# Patient Record
Sex: FEMALE | Race: WHITE | NOT HISPANIC OR LATINO | Employment: PART TIME | URBAN - METROPOLITAN AREA
[De-identification: names, ages, dates, MRNs, and addresses within clinical notes are randomized per-mention and may not be internally consistent; named-entity substitution may affect disease eponyms.]

---

## 2017-01-04 ENCOUNTER — DOCUMENTATION (OUTPATIENT)
Dept: FAMILY MEDICINE CLINIC | Facility: OTHER | Age: 37
End: 2017-01-04

## 2017-01-05 NOTE — PROGRESS NOTES
Operative Note Addendum:    This is in addition to the Op Note from 6/27/16     Her skin tag that was quite large on her vulva measuring approximately 0.5 cm. It was removed and sutured with 3 interrupted sutures of 4-0 Vicryl. The size of the lesion was 0.5 cm.

## 2022-06-29 ENCOUNTER — OFFICE VISIT (OUTPATIENT)
Dept: NEUROLOGY | Facility: CLINIC | Age: 42
End: 2022-06-29

## 2022-06-29 VITALS
SYSTOLIC BLOOD PRESSURE: 128 MMHG | HEART RATE: 64 BPM | DIASTOLIC BLOOD PRESSURE: 72 MMHG | WEIGHT: 174 LBS | RESPIRATION RATE: 16 BRPM | HEIGHT: 62 IN | BODY MASS INDEX: 32.02 KG/M2

## 2022-06-29 DIAGNOSIS — G93.6: Primary | ICD-10-CM

## 2022-06-29 DIAGNOSIS — R51.9 NONINTRACTABLE HEADACHE, UNSPECIFIED CHRONICITY PATTERN, UNSPECIFIED HEADACHE TYPE: ICD-10-CM

## 2022-06-29 DIAGNOSIS — T70.29XS: Primary | ICD-10-CM

## 2022-06-29 PROCEDURE — 99203 OFFICE O/P NEW LOW 30 MIN: CPT | Performed by: STUDENT IN AN ORGANIZED HEALTH CARE EDUCATION/TRAINING PROGRAM

## 2022-06-29 RX ORDER — ONDANSETRON 4 MG/1
4 TABLET, FILM COATED ORAL EVERY 8 HOURS PRN
COMMUNITY
Start: 2022-06-01 | End: 2022-08-14

## 2022-06-29 NOTE — PROGRESS NOTES
Chief Complaint   Patient presents with   • Syncope       Patient ID: Roxanna White is a 42 y.o. female.    HPI:    The following portions of the patient's history were reviewed and updated as appropriate: allergies, current medications, past family history, past medical history, past social history, past surgical history and problem list.    Review of Systems   Allergic/Immunologic: Negative for food allergies.   Neurological: Positive for headaches. Negative for dizziness, tremors, speech difficulty, weakness, light-headedness and numbness.   Psychiatric/Behavioral: Negative for agitation, confusion, decreased concentration and sleep disturbance. The patient is not nervous/anxious.    She was found laying on the floor of her hotel room by her father.  She had headaches at that time.  They gave her oxygen, IV hydration.      Ms. White is a 42-year-old female who presents to neurology clinic for evaluation of symptoms including loss of consciousness in the setting of high-altitude.  She works as a  at [employer].  She was staying at a hotel at [facility]. They were ascending to [facility] from [place] (505 feet above sea level) with breaks for adjustment. She was 11,500 ft above ground level. On a Saturday, She was not feeling well and stomach was feeling unsettled. She had headache, vomited multiple times. She took Advil. She was prescribed an antinausea medication but no acetazolamide. On Sunday morning she was feeling better but not well enough to go out shopping. Her symptoms worsened after she woke up. She took her dad's acetazolamide and stayed at the hotel. Within 30 minutes of drinking gatorade she threw up. She slipped and fell, did not hit her head. She tried to clean up the floor and passed out.  Loss of consciousness was of unknown duration but less than a day.. Went in June 12, 2022. Had a CT scan. Was in a hyperbaric chamber at least once. After the third day of being in the hospital she  was feeling good and ready to leave. Oxygen was low in the 70s to 80s and she was given oxygen. Has had frontal headaches since then that are much milder; Advil helps. Drinking lots of water. She was told there was fluid or swelling and showed her white dots that could have been bleeding on her CT. She feels completely back to herself now. They said a word for aneurysm when they were describing some of the findings.      Vitals:    22 1109   BP: 128/72   Pulse: 64   Resp: 16       Neurologic Exam     Mental Status   Attention: normal. Concentration: normal.   Speech: speech is normal   Level of consciousness: alert  MoCA 30/30     Cranial Nerves     CN II   Visual fields full to confrontation.   Visual acuity: normal    CN III, IV, VI   Pupils are equal, round, and reactive to light.  Extraocular motions are normal.     CN V   Facial sensation intact.     CN VII   Facial expression full, symmetric.     CN VIII   Hearing: intact    CN IX, X   Palate: symmetric    CN XI   Right trapezius strength: normal  Left trapezius strength: normal    CN XII   Tongue: not atrophic  Fasciculations: absent  Tongue deviation: none    Motor Exam   Muscle bulk: normal    Strength   Right deltoid: 5/5  Left deltoid: 5/5  Right biceps: 5/5  Left biceps: 5/5  Right triceps: 5/5  Left triceps: 5/5  Right iliopsoas: 5/5  Left iliopsoas: 5/5  Right quadriceps: 5/5  Left quadriceps: 5/5  Right hamstrin/5  Left hamstrin/5  Right anterior tibial: 5/5  Left anterior tibial: 5/5  Right gastroc: 5/5  Left gastroc: 5/5Grip 5 out of 5 bilaterally     Sensory Exam   Right arm light touch: normal  Left arm light touch: normal  Right leg light touch: normal  Left leg light touch: normal    Gait, Coordination, and Reflexes     Coordination   Finger to nose coordination: normal  Heel to shin coordination: normal    Reflexes   Right brachioradialis: 2+  Left brachioradialis: 2+  Right biceps: 2+  Left biceps: 2+  Right patellar: 2+  Left  patellar: 2+  Right achilles: 2+  Left achilles: 2+  Right plantar: normal  Left plantar: normal      Assessment/Plan:    The patient appears to have had high-altitude cerebral edema versus acute mountain sickness based on history and the treatment she received.  She seems to have made a full recovery based on her neurologic exam and clinical history.  High altitude cerebral edema can cause microbleeds and can have swelling that takes time to resolve.  I will obtain an MRI to look for the resolution of edema as well as the presence of any microbleeds.  She was told during the visit in [place] the word for aneurysm which was difficult to make out the complete clinical context.  Ordered an MR angiogram of the head to evaluate the blood vessels of the head and rule out aneurysm given that this potential finding was described during the CT of the head.    -We discussed that she should avoid significant altitudes in the future and that this can happen with any high altitude and that there is not a specific number though the risk increases as she goes higher.  The mainstay of treatment if she were to note the symptoms that she had before is to descend immediately at least 1000 m or until symptomatic improvement.  Discussed that acetazolamide is a medicine that can potentially prevent altitude sickness but descending and still the mainstay of treatment if this were to recur again at that she could keep a close eye out for the symptoms that she had during this event in June.  In addition she should also keep an eye out for clumsiness or lack of coordination known as ataxia.    -She has mild headache which are alleviated with ibuprofen per the patient.  Can ask about this at follow-up but no additional intervention recommended at this time specifically for headache.  The MRI and MRA will also be helpful in this regard to rule out intracranial causes of headache  Return in about 10 weeks (around 9/7/2022).  I spent 30 minutes  caring for this patient on this date of service. This time includes time spent by me in the following activities as necessary: preparing for the visit, reviewing tests, medical records and previous visits, obtaining and/or reviewing a separately obtained history, performing a medically appropriate exam and/or evaluation, counseling and educating the patient, and/or communicating with other healthcare professionals, documenting information in the medical record, independently interpreting results and communicating that information with the patient, and developing a medically appropriate treatment plan with consideration of other conditions, medications, and treatments.       Diagnoses and all orders for this visit:    1. High altitude cerebral edema, sequela (HCC) (Primary)  -     MRI Brain With & Without Contrast; Future  -     MRI Angiogram Head Without Contrast; Future           Feng Ordonez MD

## 2022-07-05 ENCOUNTER — TELEPHONE (OUTPATIENT)
Dept: NEUROLOGY | Facility: CLINIC | Age: 42
End: 2022-07-05

## 2022-07-05 NOTE — TELEPHONE ENCOUNTER
Provider: JENNIFER MCKENNA MD    Caller: SACHA     Relationship to Patient: [facility]      Phone Number: [phone], USE REF #[id number]    Reason for Call: STATED THEY NEED THE ORDER FOR THE MRA OF THE HEAD SENT OVER TO [facility] AT FAX # [phone]

## 2022-07-11 NOTE — TELEPHONE ENCOUNTER
I send both of the MRI/ANGO orders to [facility] as a request from [facility]

## 2022-07-26 ENCOUNTER — DOCUMENTATION (OUTPATIENT)
Dept: NEUROLOGY | Facility: CLINIC | Age: 42
End: 2022-07-26

## 2022-07-26 NOTE — PROGRESS NOTES
I reviewed the patient's MRI and MRA of the brain.  The patient's MRI of the brain was essentially normal.  There was no evidence for residual cerebral edema which is excellent.  There is no report of intracranial hemorrhage.  I would recommend that she obtains a CD of her MRIs.  If she brings it to her follow-up appointment we could take a look at it together.  The MR angiogram of the [place] showed no definite aneurysm, no blockages, no abnormal narrowing of the blood vessels of the brain.  On the right side of the brain, at the anterior communicating artery there is some asymmetric fullness. The radiologist recommended a follow-up MR angiogram to confirm stability and exclude the formation of an aneurysm. This may represent a structure called an infundibulum which is a broad base from which a small blood vessel can emerge that is not well visualized on the MRI. We will schedule this at follow-up with timing to be determined after repeat evaluation.

## 2022-07-28 ENCOUNTER — TELEPHONE (OUTPATIENT)
Dept: NEUROLOGY | Facility: CLINIC | Age: 42
End: 2022-07-28

## 2022-07-28 NOTE — TELEPHONE ENCOUNTER
Spoke to the patient and gave her the result and she voiced understanding .She states she will get a copy of the CD and will bring that with her on 9/7/2022 .The patient is getting ready to go back to [place] and will let us know if she has any problems ,jeri Hammonds

## 2022-07-28 NOTE — TELEPHONE ENCOUNTER
----- Message from Feng Ordonez MD sent at 7/26/2022  2:55 PM EDT -----  I put in a note with my review of the MRI report.  I think that she will need further vessel imaging of the head in the future but there is not an emergent or rapid need for this.  Please look at my note for my review of the imaging results.  I will discuss this further with her at her follow-up and she should ideally bring a CD with the MRI and MRA of her brain to her follow-up appointment.  ----- Message -----  From: Diane Bee MA  Sent: 7/20/2022   4:27 PM EDT  To: Feng Ordonez MD    I have her MRI and MRA [place] [facility] [place] scanned in to [facility] for you to review ,jeri

## 2022-09-07 ENCOUNTER — OFFICE VISIT (OUTPATIENT)
Dept: NEUROLOGY | Facility: CLINIC | Age: 42
End: 2022-09-07

## 2022-09-07 VITALS
SYSTOLIC BLOOD PRESSURE: 122 MMHG | HEART RATE: 68 BPM | OXYGEN SATURATION: 99 % | WEIGHT: 183 LBS | HEIGHT: 62 IN | DIASTOLIC BLOOD PRESSURE: 72 MMHG | BODY MASS INDEX: 33.68 KG/M2

## 2022-09-07 DIAGNOSIS — T70.29XS: ICD-10-CM

## 2022-09-07 DIAGNOSIS — R93.0 ABNORMAL MRI OF HEAD: Primary | ICD-10-CM

## 2022-09-07 DIAGNOSIS — G93.6: ICD-10-CM

## 2022-09-07 PROCEDURE — 99212 OFFICE O/P EST SF 10 MIN: CPT | Performed by: STUDENT IN AN ORGANIZED HEALTH CARE EDUCATION/TRAINING PROGRAM

## 2022-09-07 NOTE — PROGRESS NOTES
"Chief Complaint   Patient presents with   • cerebral edema       Patient ID: Roxanna White is a 42 y.o. female.    HPI:      The following portions of the patient's history were reviewed and updated as appropriate: allergies, current medications, past family history, past medical history, past social history, past surgical history and problem list.    Interval history:  The patient was seen by me to evaluate her after having cerebral edema on her travels. She denies any new or recurrent symptoms.    She reports that she has had 1 more headache that was \"out of my mind pain\" approximately 6 to 7 years ago. At that time she presented to the ER and received a migraine \"cocktail\" with good benefit. Currently, there are no aneurysms seen on imaging.    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    Review of Systems   Constitutional: Negative for activity change, appetite change, chills, diaphoresis, fatigue, fever and unexpected weight change.   HENT: Negative for congestion, dental problem, drooling, ear discharge, ear pain, facial swelling, hearing loss, mouth sores, nosebleeds, postnasal drip, rhinorrhea, sinus pressure, sinus pain, sneezing, sore throat, tinnitus, trouble swallowing and voice change.    Eyes: Negative for photophobia, pain, discharge, redness, itching and visual disturbance.   Musculoskeletal: Negative for arthralgias, back pain, gait problem, joint swelling, myalgias, neck pain and neck stiffness.   Neurological: Negative for dizziness, tremors, seizures, syncope, facial asymmetry, speech difficulty, weakness, light-headedness, numbness and headaches.   Psychiatric/Behavioral: Negative for agitation, behavioral problems, confusion, decreased concentration, dysphoric mood, hallucinations, self-injury, sleep disturbance and suicidal ideas. The patient is not nervous/anxious and is not hyperactive.      Vitals:    09/07/22 1013   BP: 122/72   Pulse: 68   SpO2: 99%       Neurologic Exam     Mental " Status   Attention: normal. Concentration: normal.   Speech: speech is normal   Level of consciousness: alert    Cranial Nerves     CN II   Visual fields full to confrontation.   Visual acuity: normal    CN III, IV, VI   Pupils are equal, round, and reactive to light.  Extraocular motions are normal.     CN V   Facial sensation intact.     CN VII   Facial expression full, symmetric.     CN VIII   Hearing: intact    CN IX, X   Palate: symmetric    CN XI   Right trapezius strength: normal  Left trapezius strength: normal    CN XII   Tongue: not atrophic  Fasciculations: absent  Tongue deviation: none    Motor Exam   Muscle bulk: normal    Strength   Right deltoid: 5/5  Left deltoid: 5/5  Right biceps: 5/5  Left biceps: 5/5  Right triceps: 5/5  Left triceps: 5/5  Right iliopsoas: 5/5  Left iliopsoas: 5/5  Right quadriceps: 5/5  Left quadriceps: 5/5  Right hamstrin/5  Left hamstrin/5  Right anterior tibial: 5/5  Left anterior tibial: 5/5  Right gastroc: 5/5  Left gastroc: 5/5Grip 5 out of 5 bilaterally     Sensory Exam   Right arm light touch: normal  Left arm light touch: normal  Right leg light touch: normal  Left leg light touch: normal    Gait, Coordination, and Reflexes     Coordination   Finger to nose coordination: normal  Heel to shin coordination: normal    Reflexes   Right brachioradialis: 2+  Left brachioradialis: 2+  Right biceps: 2+  Left biceps: 2+  Right patellar: 2+  Left patellar: 2+  Right achilles: 2+  Left achilles: 2+      Physical Exam  Eyes:      Extraocular Movements: EOM normal.      Pupils: Pupils are equal, round, and reactive to light.   Neurological:      Coordination: Finger-Nose-Finger Test and Heel to Shin Test normal.      Deep Tendon Reflexes:      Reflex Scores:       Bicep reflexes are 2+ on the right side and 2+ on the left side.       Brachioradialis reflexes are 2+ on the right side and 2+ on the left side.       Patellar reflexes are 2+ on the right side and 2+ on the left  side.       Achilles reflexes are 2+ on the right side and 2+ on the left side.  Psychiatric:         Speech: Speech normal.         Procedures  MRI looked good from the edema standpoint. Overall very reassuring.    MRI of the brain structure: It was essentially normal. There was no evidence for any residual cerebral edema or swelling. No evidence for hemorrhage.     MRI of the blood vessels: Overall it looks pretty good. There is no stenosis or abnormal narrowing. There was a little bit of mild fullness of the right side of the anterior communicating artery, which is part of [facility] [place] [facility] [place], and measures 1.6 mm and it is most consistent with a small infundibulum, and they recommended a follow-up MR angiogram to ensure stability, and to exclude aneurysm formation.    Assessment/Plan:         Diagnoses and all orders for this visit:    1. Abnormal MRI of head (Primary)  -     MRI Angiogram Head Without Contrast; Future    2. High altitude cerebral edema, sequela (HCC)    1. Abnormal MRI of the Head  We discussed MRI imaging. She will have a repeat MR angiogram to be performed in 06/2023 or 07/2023. She was advised to watch for signs of an aneurysm such as a severe headache unlike anything she has ever had before in her life. If this occurs, she was instructed to go to the emergency room.    2. High altitude cerebral edema  The patient plans to travel to [place] and was instructed to take acetazolamide prior to her trip due to the high altitude. If she starts to feel nauseous or sick, she was instructed to descend as safely and quickly as possible. I can provide this vs her PCP.    She will follow up in 07/2023 to go over results of the MR angiogram.    I spent 16 minutes caring for this patient on this date of service. This time includes time spent by me in the following activities as necessary: preparing for the visit, reviewing tests, medical records and previous visits, obtaining and/or reviewing a  separately obtained history, performing a medically appropriate exam and/or evaluation, counseling and educating the patient, and/or communicating with other healthcare professionals, documenting information in the medical record, independently interpreting results and communicating that information with the patient, and developing a medically appropriate treatment plan with consideration of other conditions, medications, and treatments.  Return in about 10 months (around 7/7/2023).         Transcribed from ambient dictation for Feng Ordonez MD by Pauline Orellana.  09/07/22   16:50 EDT    Patient verbalized consent to the visit recording.  I have personally performed the services described in this document as transcribed by the above individual, and it is both accurate and complete.  Feng Ordonez MD  9/10/2022  23:43 EDT

## 2023-08-23 ENCOUNTER — OFFICE VISIT (OUTPATIENT)
Dept: NEUROLOGY | Facility: CLINIC | Age: 43
End: 2023-08-23
Payer: COMMERCIAL

## 2023-08-23 ENCOUNTER — TELEPHONE (OUTPATIENT)
Dept: NEUROLOGY | Facility: OTHER | Age: 43
End: 2023-08-23

## 2023-08-23 VITALS
DIASTOLIC BLOOD PRESSURE: 96 MMHG | BODY MASS INDEX: 36.07 KG/M2 | OXYGEN SATURATION: 96 % | SYSTOLIC BLOOD PRESSURE: 130 MMHG | HEART RATE: 75 BPM | HEIGHT: 62 IN | WEIGHT: 196 LBS

## 2023-08-23 DIAGNOSIS — R93.0 ABNORMAL MRI OF HEAD: Primary | ICD-10-CM

## 2023-08-23 PROCEDURE — 99212 OFFICE O/P EST SF 10 MIN: CPT | Performed by: STUDENT IN AN ORGANIZED HEALTH CARE EDUCATION/TRAINING PROGRAM

## 2023-08-23 NOTE — TELEPHONE ENCOUNTER
BERNIE WITH [employer] [employer]  CALLED TO ADVISE, PATIENTS INSURANCE HAS BEEN REINSTATED WITH NO BREAK IN COVERAGE

## 2023-08-23 NOTE — PROGRESS NOTES
Chief Complaint   Patient presents with    Abnormal MRI of head       Patient ID: Roxanna White is a 43 y.o. female.    HPI:    The following portions of the patient's history were reviewed and updated as appropriate: allergies, current medications, past family history, past medical history, past social history, past surgical history and problem list.    Interval history:    Ms. Roxanna White is a 43-year-old female who presents for follow-up of cerebral edema on her travels. She does not have any new or recurrent symptoms. We did an MR angiogram follow-up and she had the imaging done at [place]. There was no evidence for intracranial aneurysm with a subtle asymmetric fullness of the right side of the anterior communicating artery. It is stable. No further evaluation was recommended unless there is a change in symptoms.     The patient reports that she has been doing well. She denies any recurrent similar symptoms. She states she has not got up to the mountains since that time. She denies any severe headaches.      Review of Systems   Neurological:  Negative for dizziness, tremors, seizures, syncope, facial asymmetry, speech difficulty, weakness, light-headedness, numbness and headaches.   Psychiatric/Behavioral:  Negative for agitation, behavioral problems, confusion, decreased concentration, dysphoric mood, hallucinations, self-injury, sleep disturbance and suicidal ideas. The patient is not nervous/anxious and is not hyperactive.          Vitals:    08/23/23 1112   BP: 130/96   Pulse: 75   SpO2: 96%       Neurologic Exam     Mental Status   Attention: normal. Concentration: normal.   Speech: speech is normal   Level of consciousness: alert    Cranial Nerves     CN II   Visual fields full to confrontation.   Visual acuity: normal    CN III, IV, VI   Pupils are equal, round, and reactive to light.  Extraocular motions are normal.     CN V   Facial sensation intact.     CN VII   Facial expression full,  symmetric.     CN VIII   Hearing: intact    CN IX, X   Palate: symmetric    CN XI   Right trapezius strength: normal  Left trapezius strength: normal    CN XII   Tongue: not atrophic  Fasciculations: absent  Tongue deviation: none    Motor Exam   Muscle bulk: normal    Strength   Right deltoid: 5/5  Left deltoid: 5/5  Right biceps: 5/5  Left biceps: 5/5  Right triceps: 5/5  Left triceps: 5/5  Right iliopsoas: 5/5  Left iliopsoas: 5/5  Right quadriceps: 5/5  Left quadriceps: 5/5  Right hamstrin/5  Left hamstrin/5  Right anterior tibial: 5/5  Left anterior tibial: 5/5  Right gastroc: 5/5  Left gastroc: 5/5Grip 5 out of 5 bilaterally     Sensory Exam   Right arm light touch: normal  Left arm light touch: normal  Right leg light touch: normal  Left leg light touch: normal    Gait, Coordination, and Reflexes     Coordination   Finger to nose coordination: normal  Heel to shin coordination: normal    Reflexes   Right brachioradialis: 2+  Left brachioradialis: 2+  Right biceps: 2+  Left biceps: 2+  Right patellar: 2+  Left patellar: 2+  Right achilles: 2+  Left achilles: 2+  Right plantar: normal  Left plantar: normal    Physical Exam  Eyes:      Extraocular Movements: EOM normal.      Pupils: Pupils are equal, round, and reactive to light.   Neurological:      Coordination: Finger-Nose-Finger Test and Heel to Shin Test normal.      Deep Tendon Reflexes:      Reflex Scores:       Bicep reflexes are 2+ on the right side and 2+ on the left side.       Brachioradialis reflexes are 2+ on the right side and 2+ on the left side.       Patellar reflexes are 2+ on the right side and 2+ on the left side.       Achilles reflexes are 2+ on the right side and 2+ on the left side.  Psychiatric:         Speech: Speech normal.     Procedures    Assessment/Plan:         There are no diagnoses linked to this encounter.     1. Cerebral edema (Primary)  The patient is doing well. She has no new symptoms and her MRI is reassuring in  terms of not having any clear aneurysm intracranially. There is no evidence of intracranial aneurysm. It was not a dedicated study for edema, but I have no clinical concern of her recurrent edema based on her report of clinical symptoms. She does not need further follow-up with me at this time. She can return to clinic as needed.    She was advised that if she experiences a severe abrupt onset headache to seek emergent care.     Follow-up as needed.    I spent 15 minutes caring for this patient on this date of service. This time includes time spent by me in the following activities as necessary: preparing for the visit, reviewing tests, medical records and previous visits, obtaining and/or reviewing a separately obtained history, performing a medically appropriate exam and/or evaluation, counseling and educating the patient, and/or communicating with other healthcare professionals, documenting information in the medical record, independently interpreting results and communicating that information with the patient, and developing a medically appropriate treatment plan with consideration of other conditions, medications, and treatments.    Transcribed from ambient dictation for Feng Ordonez MD by Rachele Mosqueda.  08/23/23   12:49 EDT    Patient or patient representative verbalized consent to the visit recording.  I have personally performed the services described in this document as transcribed by the above individual, and it is both accurate and complete.  Feng Ordonez MD  9/8/2023  20:44 EDT